# Patient Record
Sex: MALE
[De-identification: names, ages, dates, MRNs, and addresses within clinical notes are randomized per-mention and may not be internally consistent; named-entity substitution may affect disease eponyms.]

---

## 2019-02-08 ENCOUNTER — HOSPITAL ENCOUNTER (EMERGENCY)
Dept: HOSPITAL 14 - H.ER | Age: 29
Discharge: HOME | End: 2019-02-08
Payer: COMMERCIAL

## 2019-02-08 VITALS
OXYGEN SATURATION: 100 % | SYSTOLIC BLOOD PRESSURE: 147 MMHG | HEART RATE: 99 BPM | DIASTOLIC BLOOD PRESSURE: 88 MMHG | RESPIRATION RATE: 16 BRPM

## 2019-02-08 VITALS — TEMPERATURE: 98 F

## 2019-02-08 DIAGNOSIS — J11.1: Primary | ICD-10-CM

## 2019-02-08 NOTE — ED PDOC
History of Present Illness


History of Present Illness: 





29 years old male presents to ER for evaluation of fever and sore throat onset 

yesterday. Patient reports associated non productive cough and runny nose. 

Daughter is here also for the same complaints.





PMD: None provided





HPI: Influenza


Time Seen by Provider: 02/08/19 10:04


Chief Complaint: Flu-like Symptoms


Chief Complaint (Provider): Flu-like Symptoms


History Per: Patient


Exam Limitations: no limitations


Onset/Duration Of Symptoms: Days (x1)


Symptoms include: fever, sore throat, cough (non productive), other (Runny nose)





Past Medical History


Reviewed: Historical Data, Nursing Documentation, Vital Signs


Vital Signs: 





                                Last Vital Signs











Temp  100 F H  02/08/19 09:56


 


Pulse  99 H  02/08/19 09:56


 


Resp  16   02/08/19 09:56


 


BP  147/88   02/08/19 09:56


 


Pulse Ox  100   02/08/19 09:56














- Medical History


PMH: No Chronic Diseases





- Surgical History


Surgical History: No Surg Hx





- Family History


Family History: States: Unknown Family Hx





- Social History


Current smoker - smoking cessation education provided: No


Alcohol: Social


Drugs: Denies





- Immunization History


Hx Influenza Vaccination: No





- Home Medications


Home Medications: 


                                Ambulatory Orders











 Medication  Instructions  Recorded


 


Oseltamivir Cap [Tamiflu] 75 mg PO BID #10 cap 02/08/19














- Allergies


Allergies/Adverse Reactions: 


                                    Allergies











Allergy/AdvReac Type Severity Reaction Status Date / Time


 


No Known Allergies Allergy   Verified 02/08/19 09:56














Review of Systems


ROS Statement: Except As Marked, All Systems Reviewed And Found Negative


Constitutional: Positive for: Fever


ENT: Positive for: Nose Discharge, Throat Pain


Respiratory: Positive for: Cough





Physical Exam





- Reviewed


Nursing Documentation Reviewed: Yes


Vital Signs Reviewed: Yes





- Physical Exam


Appears: Positive for: Well, No Acute Distress


Head Exam: Positive for: ATRAUMATIC, NORMOCEPHALIC


Skin: Positive for: Normal Color, Warm, Dry


Eye Exam: Positive for: Normal appearance, EOMI, PERRL


ENT: Positive for: Normal ENT Inspection


Neck: Positive for: Normal, Painless ROM, Supple


Cardiovascular/Chest: Positive for: Regular Rate, Rhythm


Respiratory: Positive for: Normal Breath Sounds.  Negative for: Wheezing


Gastrointestinal/Abdominal: Positive for: Normal Exam, Soft.  Negative for: Te

nderness


Back: Positive for: Normal Inspection.  Negative for: L CVA Tenderness, R CVA 

Tenderness


Extremity: Positive for: Normal ROM.  Negative for: Pedal Edema, Swelling


Neurologic/Psych: Positive for: Alert, Oriented (x3)





Medical Decision Making


Medical Decision Making: 





Scribe Attestation: 


Documented by Candelaria Ramsey, acting as a scribe for Augustus Hwang MD.





Provider Scribe Attestation:


All medical record entries made by the Scribe were at my direction and 

personally dictated by me. I have reviewed the chart and agree that the record 

accurately reflects my personal performance of the history, physical exam, 

medical decision making, and the department course for this patient. I have also

 personally directed, reviewed, and agree with the discharge instructions and 

disposition.





- ECG


O2 Sat by Pulse Oximetry: 100 (RA)


Pulse Ox Interpretation: Normal





Disposition





- Clinical Impression


Clinical Impression: 


 Influenza








- Patient ED Disposition


Is Patient to be Admitted: No


Counseled Patient/Family Regarding: Diagnosis, Need For Followup, Rx Given





- Disposition


Referrals: 


[facility] [Outside]


Disposition: Routine/Home


Disposition Time: 10:57


Condition: FAIR


Prescriptions: 


Oseltamivir Cap [Tamiflu] 75 mg PO BID #10 cap


Instructions:  Flu, Adult (DC)


Forms:  [employer] (English)